# Patient Record
Sex: FEMALE | Race: WHITE | NOT HISPANIC OR LATINO | URBAN - NONMETROPOLITAN AREA
[De-identification: names, ages, dates, MRNs, and addresses within clinical notes are randomized per-mention and may not be internally consistent; named-entity substitution may affect disease eponyms.]

---

## 2019-02-27 ENCOUNTER — IMPORTED ENCOUNTER (OUTPATIENT)
Dept: URBAN - NONMETROPOLITAN AREA CLINIC 1 | Facility: CLINIC | Age: 74
End: 2019-02-27

## 2019-02-27 PROBLEM — H11.32: Noted: 2019-02-27

## 2019-02-27 PROCEDURE — 99203 OFFICE O/P NEW LOW 30 MIN: CPT

## 2019-02-27 NOTE — PATIENT DISCUSSION
Subconjunctival Heme OS-  discussed findings w/patient-  reviewed causes for [address]-  start cool compresses-  RTC n/a for Complete

## 2019-03-21 ENCOUNTER — IMPORTED ENCOUNTER (OUTPATIENT)
Dept: URBAN - NONMETROPOLITAN AREA CLINIC 1 | Facility: CLINIC | Age: 74
End: 2019-03-21

## 2019-03-21 PROBLEM — H52.4: Noted: 2019-03-21

## 2019-03-21 PROBLEM — E11.9: Noted: 2020-01-27

## 2019-03-21 PROBLEM — H52.223: Noted: 2019-03-21

## 2019-03-21 PROBLEM — H25.13: Noted: 2019-03-21

## 2019-03-21 PROBLEM — H52.03: Noted: 2019-03-21

## 2019-03-21 PROCEDURE — 92015 DETERMINE REFRACTIVE STATE: CPT

## 2019-03-21 PROCEDURE — 92014 COMPRE OPH EXAM EST PT 1/>: CPT

## 2019-10-16 ENCOUNTER — IMPORTED ENCOUNTER (OUTPATIENT)
Dept: URBAN - NONMETROPOLITAN AREA CLINIC 1 | Facility: CLINIC | Age: 74
End: 2019-10-16

## 2019-10-16 PROCEDURE — 99213 OFFICE O/P EST LOW 20 MIN: CPT

## 2019-10-16 NOTE — PATIENT DISCUSSION
Nuclear Sclerosis OU-  discussed findings w/patient-  patient is noticing more changes in vision-  BAT done today.   OD: 20/70 OS: 20/200-  refer patient for cat eval at this time-  she would like to have the surgery in Jan 2020; 's Notes: MR 3/21/2019DFE 3/21/2019

## 2020-01-27 ENCOUNTER — IMPORTED ENCOUNTER (OUTPATIENT)
Dept: URBAN - NONMETROPOLITAN AREA CLINIC 1 | Facility: CLINIC | Age: 75
End: 2020-01-27

## 2020-01-27 PROCEDURE — 92014 COMPRE OPH EXAM EST PT 1/>: CPT

## 2020-01-27 NOTE — PATIENT DISCUSSION
Cataract(s)-Visually significant cataract OU .-Cataract(s) causing symptomatic impairment of visual function not correctable with a tolerable change in glasses or contact lenses lighting or non-operative means resulting in specific activity limitations and/or participation restrictions including but not limited to reading viewing television driving or meeting vocational or recreational needs. -Expectation is clearer vision and functional improvement in symptoms as well as reduced glare disability after cataract removal.-Order IOLMaster and OPD today. -Recommend Limbal Relaxing Incisions based on today's OPD testing and lifestyle questionnaire.-All questions were answered regarding surgery including pre and post-op medications appointments activity restrictions and anesthetic usage.-The risks benefits and alternatives and special risk factors for the patient were discussed in detail including but not limited to: bleeding infection retinal detachment vitreous loss problems with the implant and possible need for additional surgery.-Although rare the possibility of complete vision loss was discussed.-The possible need for glasses post-operatively was discussed.-Order medical clearance exam based on history of DM-Patient elects to proceed with cataract surgery O . Will schedule at patient's convenience and re-evaluate O  in the future. *Doctor Recommendations* - discussed traditional v. LenSx - discussed standard implants OU - patient qualifies for LRI OU informed patient she still may need a small correction for distance patient states understanding. - patient to use AT's/Lotemax TID OU x 1 week and return for repeat topography.; 's Notes: MR 3/21/2019DFE 3/21/2019

## 2020-03-12 PROBLEM — H25.13: Noted: 2020-03-12

## 2020-03-12 PROBLEM — E11.9: Noted: 2020-03-12

## 2020-03-16 ENCOUNTER — IMPORTED ENCOUNTER (OUTPATIENT)
Dept: URBAN - NONMETROPOLITAN AREA CLINIC 1 | Facility: CLINIC | Age: 75
End: 2020-03-16

## 2020-07-31 ENCOUNTER — IMPORTED ENCOUNTER (OUTPATIENT)
Dept: URBAN - NONMETROPOLITAN AREA CLINIC 1 | Facility: CLINIC | Age: 75
End: 2020-07-31

## 2020-07-31 PROBLEM — Z01.818: Noted: 2020-07-31

## 2020-07-31 PROBLEM — H25.813: Noted: 2020-07-31

## 2020-07-31 PROCEDURE — 92014 COMPRE OPH EXAM EST PT 1/>: CPT

## 2020-07-31 NOTE — PATIENT DISCUSSION
Cataract(s)-Visually significant cataract OU .-Cataract(s) causing symptomatic impairment of visual function not correctable with a tolerable change in glasses or contact lenses lighting or non-operative means resulting in specific activity limitations and/or participation restrictions including but not limited to reading viewing television driving or meeting vocational or recreational needs. -Expectation is clearer vision and functional improvement in symptoms as well as reduced glare disability after cataract removal.-Order IOLMaster and OPD today. -Recommend Standard w/  Limbal Relaxing Incisions based on today's OPD testing and lifestyle questionnaire.-All questions were answered regarding surgery including pre and post-op medications appointments activity restrictions and anesthetic usage.-The risks benefits and alternatives and special risk factors for the patient were discussed in detail including but not limited to: bleeding infection retinal detachment vitreous loss problems with the implant and possible need for additional surgery.-Although rare the possibility of complete vision loss was discussed.-The possible need for glasses post-operatively was discussed.-Order medical clearance exam based on history of DM-Patient elects to proceed with cataract surgery OS . Will schedule at patient's convenience and re-evaluate OD  in the future. *Doctor Recommendations* - discussed traditional v. LenSx - discussed standard implants OU - patient qualifies for LRI OU informed patient she still may need a small correction for distance patient states understanding.; 's Notes: MR 3/21/2019DFE 3/21/2019

## 2020-07-31 NOTE — PATIENT DISCUSSION
"Medical Clearance-Medical clearance done today. -No outstanding concerns that would preclude surgery.-Patient is cleared to proceed with scheduled surgery. Cataract(s)-Visually significant cataract OU .-Cataract(s) causing symptomatic impairment of visual function not correctable with a tolerable change in glasses or contact lenses lighting or non-operative means resulting in specific activity limitations and/or participation restrictions including but not limited to reading viewing television driving or meeting vocational or recreational needs. -Expectation is clearer vision and functional improvement in symptoms as well as reduced glare disability after cataract removal.-Order IOLMaster and OPD today. -Recommend Limbal Relaxing Incisions based on today's OPD testing and lifestyle questionnaire.-All questions were answered regarding surgery including pre and post-op medications appointments activity restrictions and anesthetic usage.-The risks benefits and alternatives and special risk factors for the patient were discussed in detail including but not limited to: bleeding infection retinal detachment vitreous loss problems with the implant and possible need for additional surgery.-Although rare the possibility of complete vision loss was discussed.-The possible need for glasses post-operatively was discussed.-Order medical clearance exam based on history of DM-Patient elects to proceed with cataract surgery O . Will schedule at patient's convenience and re-evaluate O  in the future. *Doctor Recommendations* - discussed traditional v. LenSx - discussed standard implants OU - patient qualifies for LRI OU informed patient she still may need a small correction for distance patient states understanding. - patient to use AT's/Lotemax TID OU x 1 week and return for repeat topography. ""; 's Notes: MR 3/21/2019DFE 3/21/2019"

## 2020-08-14 ENCOUNTER — IMPORTED ENCOUNTER (OUTPATIENT)
Dept: URBAN - NONMETROPOLITAN AREA CLINIC 1 | Facility: CLINIC | Age: 75
End: 2020-08-14

## 2020-08-14 PROBLEM — H25.813: Noted: 2020-08-14

## 2020-08-14 PROBLEM — Z01.818: Noted: 2020-08-14

## 2020-09-01 ENCOUNTER — IMPORTED ENCOUNTER (OUTPATIENT)
Dept: URBAN - NONMETROPOLITAN AREA CLINIC 1 | Facility: CLINIC | Age: 75
End: 2020-09-01

## 2020-09-01 PROBLEM — Z01.818: Noted: 2020-09-01

## 2020-09-01 PROBLEM — H25.813: Noted: 2020-09-01

## 2020-09-01 PROBLEM — Z98.42: Noted: 2020-09-01

## 2020-09-01 PROCEDURE — 99024 POSTOP FOLLOW-UP VISIT: CPT

## 2020-09-01 NOTE — PATIENT DISCUSSION
s/p PC IOL OS LRI/TRAD 8/31/2020-  discussed findings w/patient-  Pt doing well s/p PCIOL. -  Continue post-op gtts according to instruction sheet and sleep with eye shield over eye for 7 nights. -  Avoid bending at the waist lifting anything over 5lbs and dirty or lucy environments.-  RTC as scheduled or prn; 's Notes: MR 3/21/2019DFE 3/21/2019

## 2020-09-04 ENCOUNTER — IMPORTED ENCOUNTER (OUTPATIENT)
Dept: URBAN - NONMETROPOLITAN AREA CLINIC 1 | Facility: CLINIC | Age: 75
End: 2020-09-04

## 2020-09-04 PROCEDURE — 99024 POSTOP FOLLOW-UP VISIT: CPT

## 2020-09-04 NOTE — PATIENT DISCUSSION
Cataract(s)-Visually significant cataract OD . -Cataract(s) causing symptomatic impairment of visual function not correctable with a tolerable change in glasses or contact lenses lighting or non-operative means resulting in specific activity limitations and/or participation restrictions including but not limited to reading viewing television driving or meeting vocational or recreational needs. -Expectation is clearer vision and functional improvement in symptoms as well as reduced glare disability after cataract removal.-Recommend Stand/Trad/Limbal Relaxing Incisions based on previous OPD testing and lifestyle questionnaire.-All questions were answered regarding surgery including pre and post-op medications appointments activity restrictions and anesthetic usage.-The risks benefits and alternatives and special risk factors for the patient were discussed in detail including but not limited to: bleeding infection retinal detachment vitreous loss problems with the implant and possible need for additional surgery.-Although rare the possibility of complete vision loss was discussed.-The need for glasses post-operatively was discussed.-Patient elects to proceed with cataract surgery OD . s/p PCIOL-Pt doing well at 1 week s/p PCIOL. -Continue post-op gtts according to instruction sheet.-Okay to resume usual activites and d/c eye shield.; 's Notes: MR 3/21/2019DFE 3/21/2019

## 2020-09-09 ENCOUNTER — IMPORTED ENCOUNTER (OUTPATIENT)
Dept: URBAN - NONMETROPOLITAN AREA CLINIC 1 | Facility: CLINIC | Age: 75
End: 2020-09-09

## 2020-09-09 PROBLEM — I10: Noted: 2020-09-09

## 2020-09-09 PROBLEM — H25.811: Noted: 2020-09-09

## 2020-09-09 PROBLEM — Z01.818: Noted: 2020-09-09

## 2020-09-09 PROBLEM — Z98.42: Noted: 2020-09-01

## 2020-09-09 PROBLEM — E78.5: Noted: 2020-09-09

## 2020-09-09 NOTE — PATIENT DISCUSSION
Medical Clearance-Medical clearance done today. -No outstanding concerns that would preclude surgery.-Patient is cleared to proceed with scheduled surgery.; 's Notes: MR 3/21/2019DFE 3/21/2019

## 2020-09-15 ENCOUNTER — IMPORTED ENCOUNTER (OUTPATIENT)
Dept: URBAN - NONMETROPOLITAN AREA CLINIC 1 | Facility: CLINIC | Age: 75
End: 2020-09-15

## 2020-09-15 PROBLEM — Z98.41: Noted: 2020-09-15

## 2020-09-15 PROCEDURE — 99024 POSTOP FOLLOW-UP VISIT: CPT

## 2020-09-15 NOTE — PATIENT DISCUSSION
s/p PC IOL LRI/Trad 9/14/2020-  discussed findings w/patient-  Pt doing well s/p PCIOL. -  Continue post-op gtts according to instruction sheet and sleep with eye shield over eye for 7 nights. -  Avoid bending at the waist lifting anything over 5lbs and dirty or lucy environments.-  RTC as scheduled or prn; 's Notes: MR 3/21/2019DFE 3/21/2019

## 2020-09-22 ENCOUNTER — IMPORTED ENCOUNTER (OUTPATIENT)
Dept: URBAN - NONMETROPOLITAN AREA CLINIC 1 | Facility: CLINIC | Age: 75
End: 2020-09-22

## 2020-09-22 PROCEDURE — 99024 POSTOP FOLLOW-UP VISIT: CPT

## 2020-09-22 NOTE — PATIENT DISCUSSION
s/p PC IOL LRI/Trad 9/14/2020-  discussed findings w/patient-  Pt doing well at 1 week s/p PCIOL. -  Continue post-op gtts according to instruction sheet.-  Okay to resume usual activites and d/c eye shield. -  RTC 3 mo DFE or prn; 's Notes: MR 3/21/2019DFE 3/21/2019

## 2021-01-06 ENCOUNTER — IMPORTED ENCOUNTER (OUTPATIENT)
Dept: URBAN - NONMETROPOLITAN AREA CLINIC 1 | Facility: CLINIC | Age: 76
End: 2021-01-06

## 2021-01-06 PROCEDURE — 99213 OFFICE O/P EST LOW 20 MIN: CPT

## 2021-01-06 NOTE — PATIENT DISCUSSION
Pseudophakia OU with PCO - Discussed diagnosis in detail with patient- Patient is stable and doing well with no glare complaint- PCO noted today but stable and no treatment needed at this time - Continue to monitorDermatochalasis UL OU- Discussed diagnosis in detail with patient- No superior field of vision loss/complaint noted at this time- Continue to monitorPVD OU-  Discussed findings w/ pt today-  Discussed findings of exam in detail with the patient. -  The risk of retinal detachment in patients with PVDs was discussed with the patient and the warning signs of retinal detachment were carefully reviewed with the patient. -  The patient was warned to return to the office or contact the ophthalmologist on call immediately if they experience signs of retinal detachment or changes in vision noted from today.  -  Continue to monitor PRN JABARI OU- Discussed diagnosis in detail with patient- Discussed signs and symptoms of progression- Recommend patient drinking plenty of water and starting Omega 3’s - Recommend Refresh or Systane  throughout the day- Consider Restasis or plugs in the future if no improvement- Continue to monitor; 's Notes: MR 3/21/2019DFE 1/6/2021

## 2021-03-22 PROBLEM — H26.493: Noted: 2021-01-06

## 2021-03-22 PROBLEM — H43.813: Noted: 2021-03-22

## 2021-03-22 PROBLEM — H02.834: Noted: 2021-03-22

## 2021-03-22 PROBLEM — Z96.1: Noted: 2021-01-06

## 2021-03-22 PROBLEM — H02.831: Noted: 2021-03-22

## 2021-03-22 PROBLEM — H16.223: Noted: 2021-03-22

## 2021-05-11 NOTE — PATIENT DISCUSSION
Cataract surgery has been performed in the first eye and activities of daily living are still impaired. The patient would like to proceed with cataract surgery in the second eye as scheduled. The patient elects BV OD, goal of emmetropia.

## 2021-07-09 ENCOUNTER — IMPORTED ENCOUNTER (OUTPATIENT)
Dept: URBAN - NONMETROPOLITAN AREA CLINIC 1 | Facility: CLINIC | Age: 76
End: 2021-07-09

## 2021-07-09 PROBLEM — H26.493: Noted: 2021-07-09

## 2021-07-09 PROBLEM — H02.834: Noted: 2021-07-09

## 2021-07-09 PROBLEM — H43.813: Noted: 2021-07-09

## 2021-07-09 PROBLEM — H16.223: Noted: 2021-07-09

## 2021-07-09 PROBLEM — Z96.1: Noted: 2021-07-09

## 2021-07-09 PROBLEM — H02.831: Noted: 2021-07-09

## 2021-07-09 PROBLEM — H00.022: Noted: 2021-07-09

## 2021-07-09 PROCEDURE — 99213 OFFICE O/P EST LOW 20 MIN: CPT

## 2021-07-09 NOTE — PATIENT DISCUSSION
Internal Anastacio YEUNG-  discussed findings w/patient-  start Tobradex QID OD x 7 days then d/c-  start hot compresses several times/day-  RTC as scheduled or prn; 's Notes: MR 3/21/2019DFE 1/6/2021

## 2021-11-04 NOTE — PATIENT DISCUSSION
Nuclear Sclerosis OU-  discussed findings w/patient-  no treatment indicated at this time-  UV protection recommended-  monitor 6 month f/u cataracts w/BAT or prn Compound Hyperopic Astigmatism OU w/Presbyopia-  discussed findings w/patient-  new spectacle Rx issued-  do not recommend update at this time-  monitor yearly or prn; 's Notes: MR 3/21/2019DFE 3/21/2019 Valtrex Pregnancy And Lactation Text: this medication is Pregnancy Category B and is considered safe during pregnancy. This medication is not directly found in breast milk but it's metabolite acyclovir is present.

## 2022-01-12 ENCOUNTER — IMPORTED ENCOUNTER (OUTPATIENT)
Dept: URBAN - NONMETROPOLITAN AREA CLINIC 1 | Facility: CLINIC | Age: 77
End: 2022-01-12

## 2022-01-12 PROBLEM — H26.493: Noted: 2022-01-12

## 2022-01-12 PROBLEM — Z96.1: Noted: 2022-01-12

## 2022-01-12 PROBLEM — H16.223: Noted: 2022-01-12

## 2022-01-12 PROBLEM — H43.813: Noted: 2022-01-12

## 2022-01-12 PROBLEM — H02.831: Noted: 2022-01-12

## 2022-01-12 PROBLEM — H02.834: Noted: 2022-01-12

## 2022-01-12 PROCEDURE — 92015 DETERMINE REFRACTIVE STATE: CPT

## 2022-01-12 PROCEDURE — 92014 COMPRE OPH EXAM EST PT 1/>: CPT

## 2022-01-12 NOTE — PATIENT DISCUSSION
Pseudophakia w/moderate PCO OU-  Discussed diagnosis in detail with patient-  worsening PCO OU-  patient notices changes in vision-  refer to [address] [place] [address] for PCO eval-  continue to monitor as per Clarisse UL OU-  Discussed diagnosis in detail with patient-  no changes noted at this time-  No superior field of vision loss/complaint noted at this time-  Continue to monitor yearly or prnPVD OU-  Discussed findings w/ pt today-  no changes noted at this time-  Discussed findings of exam in detail with the patient. -  The risk of retinal detachment in patients with PVDs was discussed with the patient and the warning signs of retinal detachment were carefully reviewed with the patient. -  The patient was warned to return to the office or contact the ophthalmologist on call immediately if they experience signs of retinal detachment or changes in vision noted from today.  -  Continue to monitor yearly or prnDES OU- Discussed diagnosis in detail with patient- Discussed signs and symptoms of progression- Recommend patient drinking plenty of water and starting Omega 3’s - Recommend Refresh or Systane  throughout the day- Consider Restasis or plugs in the future if no improvement- Continue to monitor yearly or prn; 's Notes: MR 1/12/2022DFE 1/12/2022

## 2022-04-09 ASSESSMENT — VISUAL ACUITY
OS_AM: 20/20
OD_SC: 20/30-2
OU_SC: 20/30
OS_SC: 20/25
OD_GLARE: 20/40
OS_GLARE: 20/40
OU_CC: 20/200
OS_CC: 20/30-2
OD_CC: 20/50
OS_CC: 20/30
OS_CC: 20/200
OD_PH: 20/40
OU_SC: 20/50-
OS_CC: 20/30+2
OU_SC: 20/25+
OS_CC: 20/40
OU_SC: 20/80
OD_CC: 20/80
OD_SC: 20/25+2
OS_GLARE: 20/100
OS_CC: 20/30+
OS_CC: 20/25
OU_CC: J3
OD_PAM: 20/40
OD_PH: 20/70
OD_PH: 20/40
OD_CC: 20/30+2
OU_CC: 20/20
OS_AM: 20/40
OU_SC: 20/30
OD_CC: 20/25-2
OD_SC: 20/30
OD_CC: 20/20-2
OD_PAM: 20/40
OD_SC: 20/30+2
OU_CC: J5
OD_GLARE: 20/70
OS_PH: 20/70
OU_CC: J1
OS_SC: 20/30
OS_SC: 20/20
OD_CC: 20/40
OS_SC: 20/30
OS_SC: 20/30
OD_SC: 20/25+
OD_PAM: 20/20

## 2022-04-09 ASSESSMENT — TONOMETRY
OD_IOP_MMHG: 16
OS_IOP_MMHG: 16
OS_IOP_MMHG: 16
OD_IOP_MMHG: 16
OS_IOP_MMHG: 16
OS_IOP_MMHG: 17
OD_IOP_MMHG: 16
OD_IOP_MMHG: 15
OD_IOP_MMHG: 16
OS_IOP_MMHG: 18
OD_IOP_MMHG: 16
OD_IOP_MMHG: 15
OD_IOP_MMHG: 16
OS_IOP_MMHG: 16
OS_IOP_MMHG: 18
OS_IOP_MMHG: 16
OS_IOP_MMHG: 18
OS_IOP_MMHG: 14
OD_IOP_MMHG: 16
OS_IOP_MMHG: 17

## 2022-06-09 ENCOUNTER — CONSULTATION/EVALUATION (OUTPATIENT)
Dept: URBAN - NONMETROPOLITAN AREA CLINIC 4 | Facility: CLINIC | Age: 77
End: 2022-06-09

## 2022-06-09 VITALS — HEIGHT: 55 IN

## 2022-06-09 DIAGNOSIS — H26.493: ICD-10-CM

## 2022-06-09 PROCEDURE — 99214 OFFICE O/P EST MOD 30 MIN: CPT

## 2022-06-09 PROCEDURE — 66821 AFTER CATARACT LASER SURGERY: CPT

## 2022-06-09 ASSESSMENT — VISUAL ACUITY
OD_SC: 20/30+2
OS_SC: 20/30+2
OS_BAT: 20/60
OD_BAT: 20/70

## 2022-06-09 ASSESSMENT — TONOMETRY
OS_IOP_MMHG: 14
OD_IOP_MMHG: 14

## 2022-06-09 NOTE — PATIENT DISCUSSION
(Surgical)  Visually Significant secondary to glare; schedule YAG Cap. Pros, cons, risks and benefits discussed with patient. Patient wishes to proceed.
(Without Tear) No holes, tears or detachments. Patient cautioned to call our office immediately if they experience a substantial change in their symptoms such as an increase in floaters, persistent flashes, loss of visual field (may appear as a shadow or a curtain) or decrease in visual acuity as these may indicate a retinal tear or detachment.
Monitor.
Pseudophakia w/moderate PCO OU-  Discussed diagnosis in detail with patient-  worsening PCO OU-  patient notices changes in vision-  refer to [address] [place] [address] for PCO eval-  continue to monitor as per Talhatoermias UL OU-  Discussed diagnosis in detail with patient-  no changes noted at this time-  No superior field of vision loss/complaint noted at this time-  Continue to monitor yearly or prnPVD OU-  Discussed findings w/ pt today-  no changes noted at this time-  Discussed findings of exam in detail with the patient. -  The risk of retinal detachment in patients with PVDs was discussed with the patient and the warning signs of retinal detachment were carefully reviewed with the patient. -  The patient was warned to return to the office or contact the ophthalmologist on call immediately if they experience signs of retinal detachment or changes in vision noted from today. -  Continue to monitor yearly or prnDES OU- Discussed diagnosis in detail with patient- Discussed signs and symptoms of progression- Recommend patient drinking plenty of water and starting Omega 3’s - Recommend Refresh or Systane  throughout the day- Consider Restasis or plugs in the future if no improvement- Continue to monitor yearly or prn; 's Notes: MR 1/12/2022DFE 1/12/2022.
Recommended artificial tears to use as directed.
Recommended observation.
Samples of tear supplements provided.
Stable. Observe.
minimum assist (75% patients effort)

## 2022-06-09 NOTE — PROCEDURE NOTE: CLINICAL
PROCEDURE NOTE: YAG Capsulotomy OS. Diagnosis: Other Secondary Cataract. Anesthesia: Topical. The purpose and nature of the procedure, possible alternative methods of treatment, the risks involved and the possibility of complications were discussed with patient. The Patient wishes to proceed and the consent was signed. 1 gtt Prolensa applied. The laser was then performed under topical anesthesia with no complications. Post op instructions were given to patient as well as a follow-up appointment. Patient was advised to call our office if any questions or concerns. Mario Yung

## 2022-06-28 ENCOUNTER — CLINIC PROCEDURE ONLY (OUTPATIENT)
Dept: URBAN - NONMETROPOLITAN AREA CLINIC 4 | Facility: CLINIC | Age: 77
End: 2022-06-28

## 2022-06-28 DIAGNOSIS — H26.491: ICD-10-CM

## 2022-06-28 PROCEDURE — 66821 AFTER CATARACT LASER SURGERY: CPT

## 2022-06-28 ASSESSMENT — VISUAL ACUITY
OS_SC: 20/30
OD_BAT: 20/40+2
OD_SC: 20/30+2

## 2022-06-28 ASSESSMENT — TONOMETRY
OS_IOP_MMHG: 16
OD_IOP_MMHG: 15

## 2022-06-28 NOTE — PROCEDURE NOTE: CLINICAL
PROCEDURE NOTE: YAG Capsulotomy OD. Diagnosis: Other Secondary Cataract. Anesthesia: Topical. The purpose and nature of the procedure, possible alternative methods of treatment, the risks involved and the possibility of complications were discussed with patient. The Patient wishes to proceed and the consent was signed. 1 gtt Prolensa applied. The laser was then performed under topical anesthesia with no complications. Post op instructions were given to patient as well as a follow-up appointment. Patient was advised to call our office if any questions or concerns. 1.9 MJ # 44 shots.

## 2022-06-28 NOTE — PATIENT DISCUSSION
Pseudophakia w/moderate PCO OU-  Discussed diagnosis in detail with patient-  worsening PCO OU-  patient notices changes in vision-  refer to [facility] for PCO eval-  continue to monitor as per Talhatoermias UL OU-  Discussed diagnosis in detail with patient-  no changes noted at this time-  No superior field of vision loss/complaint noted at this time-  Continue to monitor yearly or prnPVD OU-  Discussed findings w/ pt today-  no changes noted at this time-  Discussed findings of exam in detail with the patient. -  The risk of retinal detachment in patients with PVDs was discussed with the patient and the warning signs of retinal detachment were carefully reviewed with the patient. -  The patient was warned to return to the office or contact the ophthalmologist on call immediately if they experience signs of retinal detachment or changes in vision noted from today. -  Continue to monitor yearly or prnDES OU- Discussed diagnosis in detail with patient- Discussed signs and symptoms of progression- Recommend patient drinking plenty of water and starting Omega 3’s - Recommend Refresh or Systane  throughout the day- Consider Restasis or plugs in the future if no improvement- Continue to monitor yearly or prn; 's Notes: MR 1/12/2022DFE 1/12/2022.

## 2022-08-25 NOTE — PATIENT DISCUSSION
Artificial Tears: One drop to both eyes 3-4 times daily. We recommend Systane or Refresh lubricating eye drops which can be found at any pharmacy. Please review. Protocol Failed or has no Protocol  Requested Prescriptions   Pending Prescriptions Disp Refills    losartan 100 MG Oral Tab 90 tablet 1     Sig: Take 1 tablet (100 mg total) by mouth daily. Hypertensive Medications Protocol Failed - 8/25/2022  9:42 AM        Failed - Last BP reading less than 140/90     BP Readings from Last 1 Encounters:  11/15/21 : (!) 151/91                Failed - CMP or BMP in past 6 months     No results found for this or any previous visit (from the past 4392 hour(s)).               Failed - In person appointment or virtual visit in the past 6 months       Recent Outpatient Visits              1 month ago Neoplasm of soft tissue of buttock    [facility] Deandra Khanna MD    Office Visit    9 months ago Routine general medical examination at a [facility], Shandra Acosta MD    Office Visit    1 year ago Routine general medical examination at a [facility], [address] Wilkerson, Christiano Mei MD    Office Visit    4 years ago Routine general medical examination at a [facility], [address] MD    Office Visit    6 years ago Chest pain, unspecified type    [facility], [address] MD    Office Visit                 Failed - GFR > 50     No results found for: [facility]              Passed - In person appointment in the past 12 or next 3 months       Recent Outpatient Visits              1 month ago Neoplasm of soft tissue of buttock    [facility] Deandra Khanna MD    Office Visit    9 months ago Routine general medical examination at a [facility], Arcadio Wilkerson, Christiano Mei MD    Office Visit    1 year ago Routine general medical examination at a [facility], [address], [address] [facility] Antonio Arvizu MD    Office Visit    4 years ago Routine general medical examination at a health care facility    [address] [facility] [place], [address] [place], Aye Garcia MD    Office Visit    6 years ago Chest pain, unspecified type    Rox Grant MD    Office Visit                       Recent Outpatient Visits              1 month ago Neoplasm of soft tissue of buttock    [address] [facility] Naveen Diallo MD    Office Visit    9 months ago Routine general medical examination at a health care facility    [address], Caryle Arrow, MD    Office Visit    1 year ago Routine general medical examination at a health care facility    [address] [facility], Vidal Yeager MD    Office Visit    4 years ago Routine general medical examination at a health care facility    [address] [place], Benjy Villar MD    Office Visit    6 years ago Chest pain, unspecified type    Rox Grant MD    Office Visit